# Patient Record
Sex: FEMALE | Race: WHITE | NOT HISPANIC OR LATINO | URBAN - METROPOLITAN AREA
[De-identification: names, ages, dates, MRNs, and addresses within clinical notes are randomized per-mention and may not be internally consistent; named-entity substitution may affect disease eponyms.]

---

## 2020-11-12 ENCOUNTER — EMERGENCY (EMERGENCY)
Facility: HOSPITAL | Age: 80
LOS: 1 days | Discharge: HOME | End: 2020-11-12
Attending: EMERGENCY MEDICINE | Admitting: EMERGENCY MEDICINE
Payer: MEDICARE

## 2020-11-12 VITALS
DIASTOLIC BLOOD PRESSURE: 80 MMHG | WEIGHT: 149.91 LBS | HEART RATE: 104 BPM | RESPIRATION RATE: 18 BRPM | TEMPERATURE: 98 F | OXYGEN SATURATION: 98 % | HEIGHT: 65 IN | SYSTOLIC BLOOD PRESSURE: 134 MMHG

## 2020-11-12 VITALS
TEMPERATURE: 98 F | DIASTOLIC BLOOD PRESSURE: 70 MMHG | SYSTOLIC BLOOD PRESSURE: 155 MMHG | OXYGEN SATURATION: 99 % | RESPIRATION RATE: 18 BRPM | HEART RATE: 107 BPM

## 2020-11-12 LAB — SARS-COV-2 RNA SPEC QL NAA+PROBE: SIGNIFICANT CHANGE UP

## 2020-11-12 RX ORDER — SODIUM CHLORIDE 9 MG/ML
500 INJECTION INTRAMUSCULAR; INTRAVENOUS; SUBCUTANEOUS ONCE
Refills: 0 | Status: COMPLETED | OUTPATIENT
Start: 2020-11-12 | End: 2020-11-12

## 2020-11-12 RX ADMIN — SODIUM CHLORIDE 500 MILLILITER(S): 9 INJECTION INTRAMUSCULAR; INTRAVENOUS; SUBCUTANEOUS at 20:30

## 2020-11-12 NOTE — ED ADULT NURSE NOTE - NSIMPLEMENTINTERV_GEN_ALL_ED
Implemented All Universal Safety Interventions:  [place] to call system. Call bell, personal items and telephone within reach. Instruct patient to call for assistance. Room bathroom lighting operational. Non-slip footwear when patient is off stretcher. Physically safe environment: no spills, clutter or unnecessary equipment. Stretcher in lowest position, wheels locked, appropriate side rails in place.

## 2020-11-12 NOTE — ED ADULT NURSE NOTE - CHIEF COMPLAINT QUOTE
PT BIBA from [facility] MD. Pt presented for covid testing, c/o fatigue, cough and was noted to be tachycardic.

## 2020-11-12 NOTE — ED PROVIDER NOTE - PHYSICAL EXAMINATION
GEN: Well appearing, well nourished, awake, alert, oriented to person, place, time/situation and in no apparent distress. NTAF  ENT: Airway patent, Nasal mucosa clear. Mouth with normal mucosa.  EYES: Clear bilaterally. PERRL, EOMI  RESPIRATORY: Breathing comfortably with normal RR. No W/C/R, no hypoxia or resp distress.  CARDIAC: Irregularly irregular rate and rhythm   ABDOMEN: Soft, nontender, +bowel sounds, no rebound, rigidity, or guarding.  MSK: Range of motion is not limited, no deformities noted.  NEURO: Alert and oriented, no focal deficits.  SKIN: Skin normal color for race, warm, dry and intact. No evidence of rash.  PSYCH: Alert and oriented to person, place, time/situation. normal mood and affect. no apparent risk to self or others.

## 2020-11-12 NOTE — ED PROVIDER NOTE - NSFOLLOWUPINSTRUCTIONS_ED_ALL_ED_FT
Please follow up with your primary care physician and a pulmonologist. If you have any problem getting an appointment this week, please call the ED Referral Coordinator at [phone].  Return to the Emergency Department if you have any new or worsening symptoms, or for any other concerns. Please read below for further information.      Viral Respiratory Infection      A viral respiratory infection is an illness that affects parts of the body that are used for breathing. These include the lungs, nose, and throat. It is caused by a germ called a virus.  Some examples of this kind of infection are:  •A cold.      •The flu (influenza).      •A respiratory syncytial virus (RSV) infection.    A person who gets this illness may have the following symptoms:  •A stuffy or runny nose.      •Yellow or green fluid in the nose.      •A cough.      •Sneezing.      •Tiredness (fatigue).      •Achy muscles.      •A sore throat.      •Sweating or chills.      •A fever.      •A headache.        Follow these instructions at home:    Managing pain and congestion     •Take over-the-counter and prescription medicines only as told by your doctor.      •If you have a sore throat, gargle with salt water. Do this 3–4 times per day or as needed. To make a salt-water mixture, dissolve ½–1 tsp of salt in 1 cup of warm water. Make sure that all the salt dissolves.      •Use nose drops made from salt water. This helps with stuffiness (congestion). It also helps soften the skin around your nose.      •Drink enough fluid to keep your pee (urine) pale yellow.        General instructions      •Rest as much as possible.      • Do not drink alcohol.      • Do not use any products that have nicotine or tobacco, such as cigarettes and e-cigarettes. If you need help quitting, ask your doctor.      •Keep all follow-up visits as told by your doctor. This is important.        How is this prevented?      •Get a flu shot every year. Ask your doctor when you should get your flu shot.    • Do not let other people get your germs. If you are sick:  •Stay home from work or school.      •Wash your hands with soap and water often. Wash your hands after you cough or sneeze. If soap and water are not available, use hand .        •Avoid contact with people who are sick during cold and flu season. This is in fall and winter.        Get help if:    •Your symptoms last for 10 days or longer.      •Your symptoms get worse over time.      •You have a fever.      •You have very bad pain in your face or forehead.      •Parts of your jaw or neck become very swollen.        Get help right away if:    •You feel pain or pressure in your chest.      •You have shortness of breath.      •You faint or feel like you will faint.      •You keep throwing up (vomiting).      •You feel confused.        Summary    •A viral respiratory infection is an illness that affects parts of the body that are used for breathing.      •Examples of this illness include a cold, the flu, and respiratory syncytial virus (RSV) infection.      •The infection can cause a runny nose, cough, sneezing, sore throat, and fever.      •Follow what your doctor tells you about taking medicines, drinking lots of fluid, washing your hands, resting at home, and avoiding people who are sick.      This information is not intended to replace advice given to you by your health care provider. Make sure you discuss any questions you have with your health care provider.      Pulmonary Nodule      A pulmonary nodule is tissue that has grown on your lung. A nodule may be cancer, but most nodules are not cancer.      Follow these instructions at home:     •Take over-the-counter and prescription medicines only as told by your doctor.      • Do not use any products that have nicotine or tobacco, such as cigarettes and e-cigarettes. If you need help quitting, ask your doctor.      •Keep all follow-up visits as told by your doctor. This is important.        Contact a doctor if:    •You have trouble breathing when doing activities.      •You feel sick.      •You feel more tired than normal.      •You do not feel like eating.      •You lose weight without trying.      •You have chills.      •You have night sweats.        Get help right away if:    •You cannot catch your breath.      •You start making whistling sounds when breathing (wheezing).      •You cannot stop coughing.      •You cough up blood.      •You get dizzy.      •You feel like you are going to pass out (faint).      •You have sudden chest pain.      •You have a fever or symptoms for more than 2–3 days.      •You have a fever and your symptoms suddenly get worse.        Summary    •A pulmonary nodule is tissue that has grown on your lung.      •Most nodules are not cancer.      •Your doctor will do tests to know what kind of nodule you have, and whether you need treatment for it.      This information is not intended to replace advice given to you by your health care provider. Make sure you discuss any questions you have with your health care provider.      Document Revised: 01/11/2019 Document Reviewed: 01/16/2018

## 2020-11-12 NOTE — ED ADULT TRIAGE NOTE - CHIEF COMPLAINT QUOTE
PT BIBA from [facility] MD. Pt presented for covid testing, c/o fatigue, cough and was noted to be tachycardic.

## 2020-11-12 NOTE — ED PROVIDER NOTE - CARE PLAN
Principal Discharge DX:	Weakness  Secondary Diagnosis:	Tachycardia   Principal Discharge DX:	Viral respiratory infection  Secondary Diagnosis:	Tachycardia  Secondary Diagnosis:	Pulmonary nodules  Secondary Diagnosis:	Afib  Secondary Diagnosis:	Bronchiolitis

## 2020-11-12 NOTE — ED PROVIDER NOTE - CLINICAL SUMMARY MEDICAL DECISION MAKING FREE TEXT BOX
80F with a h/o afib (on eliquis and metoprolol), HTN, hypothyroidism, digestive issues/chronic diarrhea, L ALEX 2 months ago who p/w generalized fatigue and weakness for months associated with recent dry cough. She went to [facility] for covid testing and was sent to the ER due to concern for rapid heart rate and for covid testing. Pt generally weak but otherwise well appearing, HR variable with afib ranging from 80-110s, EKG afib rate 93, no ischemia. No focal neuro deficits or resp distress on exam. Will check labs including trop and DD. Labs revela DD 700s will get CTA r/o PE.

## 2020-11-12 NOTE — ED ADULT NURSE REASSESSMENT NOTE - NS ED NURSE REASSESS COMMENT FT1
provided pt with comfort care, assisted pt to toilet, rearranged the stretcher. pt currently, resting comfortably on the stretcher.

## 2020-11-12 NOTE — ED PROVIDER NOTE - OBJECTIVE STATEMENT
80F with a h/o afib (on eliquis and metoprolol), HTN, hypothyroidism, digestive issues/chronic diarrhea, L ALEX 2 months ago who p/w generalized fatigue and weakness for months associated with recent dry cough. She went to [facility] for covid testing and was sent to the ER due to concern for rapid heart rate and for covid testing. No f/c, no cp/sob, no n/v, no abd pain, no ha or neck pain, no dizziness or syncope, no n/t/w in ext, no LE edema, no trauma or fall. No other complaints.

## 2020-11-12 NOTE — ED ADULT NURSE NOTE - CHPI ED NUR SYMPTOMS NEG
no back pain/no diaphoresis/no fever/no nausea/no syncope/no congestion/no dizziness/no shortness of breath/no vomiting/no chest pain/no chills

## 2020-11-12 NOTE — ED ADULT NURSE REASSESSMENT NOTE - NS ED NURSE REASSESS COMMENT FT1
endorsed pt from JOSE ALBERTO Kumar for continuous care. pt  resting comfortably on the stretcher. NS 0.9% IV bolus given.

## 2020-11-12 NOTE — ED PROVIDER NOTE - PATIENT PORTAL LINK FT
You can access the FollowMyHealth Patient Portal offered by [facility] by registering at the following website: [email or web address]. By joining [employer]’s FollowMyHealth portal, you will also be able to view your health information using other applications (apps) compatible with our system.

## 2020-11-16 DIAGNOSIS — R53.83 OTHER FATIGUE: ICD-10-CM

## 2020-11-16 DIAGNOSIS — I48.91 UNSPECIFIED ATRIAL FIBRILLATION: ICD-10-CM

## 2020-11-16 DIAGNOSIS — I10 ESSENTIAL (PRIMARY) HYPERTENSION: ICD-10-CM

## 2020-11-16 DIAGNOSIS — Z20.828 CONTACT WITH AND (SUSPECTED) EXPOSURE TO OTHER VIRAL COMMUNICABLE DISEASES: ICD-10-CM

## 2020-11-16 DIAGNOSIS — R00.0 TACHYCARDIA, UNSPECIFIED: ICD-10-CM

## 2020-11-16 DIAGNOSIS — J06.9 ACUTE UPPER RESPIRATORY INFECTION, UNSPECIFIED: ICD-10-CM

## 2020-11-16 DIAGNOSIS — J21.9 ACUTE BRONCHIOLITIS, UNSPECIFIED: ICD-10-CM

## 2020-11-16 DIAGNOSIS — R91.1 SOLITARY PULMONARY NODULE: ICD-10-CM
